# Patient Record
Sex: MALE | Race: WHITE | NOT HISPANIC OR LATINO | Employment: FULL TIME | URBAN - METROPOLITAN AREA
[De-identification: names, ages, dates, MRNs, and addresses within clinical notes are randomized per-mention and may not be internally consistent; named-entity substitution may affect disease eponyms.]

---

## 2018-04-17 DIAGNOSIS — M51.16 LUMBAR DISC DISEASE WITH RADICULOPATHY: Primary | ICD-10-CM

## 2018-04-24 ENCOUNTER — HOSPITAL ENCOUNTER (OUTPATIENT)
Dept: RADIOLOGY | Facility: HOSPITAL | Age: 33
Discharge: HOME | End: 2018-04-24
Attending: INTERNAL MEDICINE
Payer: COMMERCIAL

## 2018-04-24 DIAGNOSIS — M51.16 LUMBAR DISC DISEASE WITH RADICULOPATHY: ICD-10-CM

## 2018-04-24 PROCEDURE — 72148 MRI LUMBAR SPINE W/O DYE: CPT | Mod: TC

## 2018-04-24 PROCEDURE — 72148 MRI LUMBAR SPINE W/O DYE: CPT | Mod: 26,,, | Performed by: RADIOLOGY

## 2018-06-11 ENCOUNTER — OFFICE VISIT (OUTPATIENT)
Dept: NEUROSURGERY | Facility: CLINIC | Age: 33
End: 2018-06-11
Payer: COMMERCIAL

## 2018-06-11 VITALS
WEIGHT: 282.19 LBS | SYSTOLIC BLOOD PRESSURE: 143 MMHG | HEART RATE: 72 BPM | BODY MASS INDEX: 35.27 KG/M2 | DIASTOLIC BLOOD PRESSURE: 91 MMHG

## 2018-06-11 DIAGNOSIS — M51.36 DDD (DEGENERATIVE DISC DISEASE), LUMBAR: ICD-10-CM

## 2018-06-11 DIAGNOSIS — M79.18 MYOFASCIAL PAIN SYNDROME, CERVICAL: Primary | ICD-10-CM

## 2018-06-11 DIAGNOSIS — M54.59 MECHANICAL LOW BACK PAIN: ICD-10-CM

## 2018-06-11 PROCEDURE — 99204 OFFICE O/P NEW MOD 45 MIN: CPT | Mod: S$GLB,,, | Performed by: NEUROLOGICAL SURGERY

## 2018-06-11 PROCEDURE — 99999 PR PBB SHADOW E&M-EST. PATIENT-LVL III: CPT | Mod: PBBFAC,,, | Performed by: NEUROLOGICAL SURGERY

## 2018-06-11 PROCEDURE — 3008F BODY MASS INDEX DOCD: CPT | Mod: CPTII,S$GLB,, | Performed by: NEUROLOGICAL SURGERY

## 2018-06-11 NOTE — PROGRESS NOTES
NEUROSURGICAL OUTPATIENT CONSULTATION NOTE    DATE OF SERVICE:  06/11/2018    ATTENDING PHYSICIAN:  Jagdish Luis MD    CONSULT REQUESTED BY:  Self-referred    REASON FOR CONSULT:  Low back pain    SUBJECTIVE:    HISTORY OF PRESENT ILLNESS:  This is a very pleasant 32 y.o. male who works as a . Reports low back pain when lifting. Pain is not constant. Pain can irradiates in the right posterior leg. No weakness or numbness in the LE. Also reports tenderness at the cervico-thoracic junction between the scapula. Left neck pain with 4th and 5th fingers numbness on the left side that is intermittent. No elbow tenderness. Tried chiropractor and massage treatment without significant pain relief.         Low Back Pain Scale  R Low Back-Pain Score: 3  R Low Back-Pain Intensity: The pain is bad, but I manage without taking pain killers  R Low Back-Pain Score: I can look after myself normally without causing extra pain  Low Back-Lifting: I can lift heavy weights but it gives extra pain   Low Back-Walking: Pain does not prevent me from walking any distance   Low Back-Sitting: I can sit in any chair as long as I like   Low Back-Standing: I have some pain on standing but it does not increase with time   Low Back-Sleeping: I have pain in bed but it does not prevent me from sleeping well   Low Back-Social Life: My social life is normal and give me no pain   Low Back-Traveling: I have some pain when traveling but aminah of my usual forms of travel make it any worse   Low Back-Changing Degree of Pain:  (my pain is neither getting better nor worse)         PAST MEDICAL HISTORY:  Active Ambulatory Problems     Diagnosis Date Noted    No Active Ambulatory Problems     Resolved Ambulatory Problems     Diagnosis Date Noted    No Resolved Ambulatory Problems     No Additional Past Medical History       PAST SURGICAL HISTORY:  Past Surgical History:   Procedure Laterality Date    NOSE SURGERY         SOCIAL HISTORY:   Social  History     Social History    Marital status: Single     Spouse name: N/A    Number of children: N/A    Years of education: N/A     Occupational History    Not on file.     Social History Main Topics    Smoking status: Never Smoker    Smokeless tobacco: Not on file    Alcohol use Yes    Drug use: Unknown    Sexual activity: Not on file     Other Topics Concern    Not on file     Social History Narrative    No narrative on file       FAMILY HISTORY:  No family history on file.    CURRENTS MEDICATIONS:  No current outpatient prescriptions on file prior to visit.     No current facility-administered medications on file prior to visit.        ALLERGIES:  Review of patient's allergies indicates:   Allergen Reactions    Biaxin [clarithromycin]     Keflex [cephalexin]     Levaquin [levofloxacin] Swelling    Wasp sting [allergen ext-venom-honey bee] Swelling       REVIEW OF SYSTEMS:  Review of Systems   Constitutional: Negative for diaphoresis, fever and weight loss.   Respiratory: Negative for shortness of breath.    Cardiovascular: Negative for chest pain.   Gastrointestinal: Negative for blood in stool.   Genitourinary: Negative for hematuria.   Endo/Heme/Allergies: Does not bruise/bleed easily.   All other systems reviewed and are negative.      OBJECTIVE:    PHYSICAL EXAMINATION:   Vitals:    06/11/18 1338   BP: (!) 143/91   Pulse: 72       Physical Exam:  Vitals reviewed.    Constitutional: He appears well-developed and well-nourished.     Eyes: Pupils are equal, round, and reactive to light. Conjunctivae and EOM are normal.     Cardiovascular: Normal distal pulses and no edema.     Abdominal: Soft.     Skin: Skin displays no rash on trunk and no rash on extremities. Skin displays no lesions on trunk and no lesions on extremities.     Psych/Behavior: He is alert. He is oriented to person, place, and time. He has a normal mood and affect.     Musculoskeletal:        Neck: Range of motion is limited. ROM  severity is to the left.     Neurological:        DTRs: Tricep reflexes are 2+ on the right side and 2+ on the left side. Bicep reflexes are 2+ on the right side and 2+ on the left side. Brachioradialis reflexes are 2+ on the right side and 2+ on the left side. Patellar reflexes are 2+ on the right side and 2+ on the left side. Achilles reflexes are 2+ on the right side and 2+ on the left side.       Back Exam     Tenderness   The patient is experiencing tenderness in the cervical.    Range of Motion   Extension: normal   Flexion:  70 abnormal   Lateral Bend Right: normal   Lateral Bend Left: normal   Rotation Right: normal   Rotation Left: normal     Muscle Strength   Right Quadriceps:  5/5   Left Quadriceps:  5/5   Right Hamstrings:  5/5   Left Hamstrings:  5/5     Tests   Straight leg raise right: negative  Straight leg raise left: negative    Other   Toe Walk: normal  Heel Walk: normal                Neurologic Exam     Mental Status   Oriented to person, place, and time.   Speech: speech is normal   Level of consciousness: alert    Cranial Nerves   Cranial nerves II through XII intact.     CN III, IV, VI   Pupils are equal, round, and reactive to light.  Extraocular motions are normal.     Motor Exam   Muscle bulk: normal  Overall muscle tone: normal    Strength   Right deltoid: 5/5  Left deltoid: 5/5  Right biceps: 5/5  Left biceps: 5/5  Right triceps: 5/5  Left triceps: 5/5  Right wrist flexion: 5/5  Left wrist flexion: 5/5  Right wrist extension: 5/5  Left wrist extension: 5/5  Right interossei: 5/5  Left interossei: 5/5  Right iliopsoas: 5/5  Left iliopsoas: 5/5  Right quadriceps: 5/5  Left quadriceps: 5/5  Right hamstrin/5  Left hamstrin/5  Right anterior tibial: 5/5  Left anterior tibial: 5/5  Right posterior tibial: 5/5  Left posterior tibial: 5/5  Right peroneal: 5/5  Left peroneal: 5/5  Right gastroc: 5/5  Left gastroc: 5/5    Sensory Exam   Light touch normal.   Pinprick normal.     Gait,  Coordination, and Reflexes     Gait  Gait: normal    Coordination   Finger to nose coordination: normal  Tandem walking coordination: normal    Reflexes   Right brachioradialis: 2+  Left brachioradialis: 2+  Right biceps: 2+  Left biceps: 2+  Right triceps: 2+  Left triceps: 2+  Right patellar: 2+  Left patellar: 2+  Right achilles: 2+  Left achilles: 2+  Right plantar: normal  Left plantar: normal  Right Zavala: absent  Left Zavala: absent  Right ankle clonus: absent  Left ankle clonus: absent        DIAGNOSTIC DATA:  I personally reviewed the following imaging:   Lumbar spine MRI 04/24/2018: L4-5 ddd with central annular tear and subligamentous central herniation without significant stenosis, left L3-4 small disc herniation with lateral recess stenosis.     ASSESMENT:  This is a 32 y.o. male with     Problem List Items Addressed This Visit     None      Visit Diagnoses     Myofascial pain syndrome, cervical    -  Primary    Relevant Orders    Ambulatory consult to Physical Therapy    DDD (degenerative disc disease), lumbar        Relevant Orders    Ambulatory consult to Physical Therapy    Mechanical low back pain        Relevant Orders    Ambulatory consult to Physical Therapy          PLAN:  Cervical and lumbar PT 3 times a week for 6 weeks  Weight loss  Low carb diet  Follow-up in 6 weeks          Jagdish Luis MD  Pager: [phone]

## 2018-06-22 ENCOUNTER — CLINICAL SUPPORT (OUTPATIENT)
Dept: REHABILITATION | Facility: HOSPITAL | Age: 33
End: 2018-06-22
Attending: NEUROLOGICAL SURGERY
Payer: COMMERCIAL

## 2018-06-22 DIAGNOSIS — M54.40 BACK PAIN OF LUMBAR REGION WITH SCIATICA: ICD-10-CM

## 2018-06-22 PROCEDURE — 97161 PT EVAL LOW COMPLEX 20 MIN: CPT | Mod: PO

## 2018-06-22 PROCEDURE — 97110 THERAPEUTIC EXERCISES: CPT | Mod: PO

## 2018-06-22 PROCEDURE — 97140 MANUAL THERAPY 1/> REGIONS: CPT | Mod: PO

## 2018-06-22 NOTE — PROGRESS NOTES
"  PHYSICAL THERAPY INITIAL EVALUATION     Date: 06/22/2018  Name: Fransico Masters  Clinic Number: [id number]    Visit #: 1   Start Time:  1000  Stop Time:  1100  Time in treatment: 50 minutes    Orders:   MD PATTIE FUN[facility] NEUROSURGERY    Priority Start Date Expiration Date Referral Entered By   Routine 06/14/2018 12/31/2018 Jagdish Luis MD   Visits Requested Visits Authorized Visits Completed Visits Scheduled   1 20 1 8   Procedure Information     Procedure Modifiers Provider Requested Approved   [id number] - Ambulatory consult to Physical Therapy   1 1   Diagnosis Information     Diagnosis   M79.1 (ICD-10-CM) - Myofascial pain syndrome, cervical   M51.36 (ICD-10-CM) - DDD (degenerative disc disease), lumbar   M54.5 (ICD-10-CM) - Mechanical low back pain         History   History of Present Illness: " 32 y.o. male who works as a . Reports low back pain when lifting. Pain is not constant. Pain can irradiates in the right posterior leg. No weakness or numbness in the LE. Also reports tenderness at the cervico-thoracic junction between the scapula. Left neck pain with 4th and 5th fingers numbness on the left side that is intermittent. No elbow tenderness. Tried chiropractor and massage treatment without significant pain relief.   "       Treatment Diagnosis:   1. Back pain of lumbar region with sciatica         Past Medical History    No past medical history on file.    Allergies  Review of patient's allergies indicates:   Allergen Reactions    Biaxin [clarithromycin]     Keflex [cephalexin]     Levaquin [levofloxacin] Swelling    Wasp sting [allergen ext-venom-honey bee] Swelling       Current Medication list:   No current outpatient prescriptions on file prior to visit.     No current facility-administered medications on file prior to visit.        Precautions: Standard  Prior Therapy: no  Prior Cervical or Back surgery no  Prior TAYLOR  no  Diagnostic Tests:     FINDINGS:  There is no evidence of " fracture or marrow placement process.  There is mild disc space narrowing and disc desiccation at L4-5.  Conus terminates at approximately L1.  Visualized cord signal is within normal range.  The visualized retroperitoneal structures demonstrate no significant abnormality.    T12-L1: There is no significant disc protrusion, central canal stenosis or neural foraminal narrowing.    L1-L2, L2-L3: Mild diffuse disc bulge without evidence of significant central canal stenosis or neural foraminal narrowing.    L3-L4: There is a diffuse disc bulge with a superimposed left paracentral/foraminal protrusion with an annular fissure.  This causes mild neural foraminal narrowing but may abut the exiting L3 and possibly the deep sending L4 nerve roots.  Overall, no significant central canal stenosis or right neural foraminal narrowing.    L4-5: Diffuse disc bulge with a annular fissure centrally with a small superimposed central protrusion.  No significant central canal stenosis or neural foraminal narrowing.    L5-S1: Mild diffuse disc bulge without evidence of significant central canal stenosis or neural foraminal narrowing.    There is a pear-shaped configuration of the thecal sac at the level of S1-S2 possibly representing a partially conjoined root as an incidental finding.   Impression       Degenerative disc disease at L3-4 and L4-5 as detailed above.  At L3-4, there is a left paracentral/foraminal disc protrusion which may impinge on the descending left L4 and possibly the L eft exiting L3 nerve root.  Correlate for symptoms referable to this level.         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 Numbness n occasion to lefth 4th and 5th digits  Chief complaint: Nagging pain that is intermittent   Activities that increase symptoms:   Crawling under houses, lifting heavy items in awkward positions at work  Activities that  decrease symptoms:   rest  Are symptoms changing since onset:  No just persistent   Pain level with 0 being the lowest and 10 being at onset of treatment:  3-4  Pain level with 0 being the lowest and 10 being at conclusion of treatment:   3-4  Pain level at best: 0  Pain level at worst: 5  Has there been a change in functional status since onset of symptoms:   no  Current functional status: independent  Exercise routine prior to onset :  None    Coughing, sneezing, and straining do not affect symptoms   Bowel and Bladder function is normal  Numbness or tingling: no  Weakness in UEs?  Pts goals:decrease pain,     Objective   32 y.o. White male arrives to clinic in NAD  GAIT: no deviation    POSTURAL examination in standing: forward head, rounded shoulders,protrusive abdomen, level pelvis     Has a slouched posture   Does body habitus affect function and or movement ability?  yes     Cervical ROM: WNL with pain associated with flexion, bilateral lateral flexion, bilateral rotation      Sharp Piyush  test of Tranverse Ligament:    Alar Ligament Stress  Test of stability of C2 motion: normal    AROM shoulders:      WNL  AROM elbow:            WNL         AROM hand               WNL      UE MMT                                 RIGHT                                    LEFT                                                 Grossly 5/5                              Grossly 5/5                  Palpation: Tender to palpation at T4,5,6,7,8,9 spinous processes and transverse processes                     Muscle spasms not noted at cervical or thoracic regions                      Overall muscle tone increased     Sensation:  Intact to light touch UE, LEs              LUMBAR  ROM: 100% of normal range                    Flexion:                       100% with  pain       Extension:                       100% with  pain       Right Sidebendin% with  pain       Left Sidebendin% with out pain       Right Rotation:          100% with out pain       Left Rotation:          100% with out  pain     AROM Hips: WFL   AROM Knees WFL   AROM Ankles WFL      LE MMT                                  Right   Grossly 5/5                 Left  Grossly 5/5 Left        FLEXIBILITY:  Right:  Hamstrings tight to 0 degrees in supine with hip flexed to 90 degrees  Left:  Hamstrings tight to 0 degrees in supine with hip flexed to 90 degrees  Gastroc/soleus: neutral   Piriformis:tight bilaterally     SPECIAL TESTS  SLR: negative  Bilateral bridge  negative  Unilateral bridge  nt    PALPATION: Tender to palpation at midline LS junction                        Muscle spasms notnoted  In LS region  SENSATION: intact to light touch BUE        Treatment          TREATMENT: EVALUATION COMPLETE   THEREX: 15 minutes of one to one instruction , manual and/or verbal cues for exercises designed to stretch, strengthen and build endurance of the affected body parts  5  reps of each with a 5 second hold time  In supine:  Pelvic tilt  Pelvic tilt > bridge   Pelvic tilt set > single knee to chest  Pelvic tilt > double knee to chest  Pelvic tilt > lateral lumbar rotation  Sciatic nerve floss  Seated thoracic extension  Lower cervical extension  Scapular retraction  Shoulder rolls posteriorly  Cervical Retraction  Achilles stretch on step 5 x 10 sec  Gastroc stretch on step 5 x 10 sec    Manual therapy x 10 min to thoracic spine with PA glide grade 3 at t 9-10, DF<, MFR to thoracic and lumbar paravertebral mm    Education   Patient was provided with a written copy of the above home exercises indicated  in bold to perform as tolerated within limits of pain.  Exercises were reviewed and patient was able to demonstrate them prior to the end of the session.Pt instructed in proper use of ice or heat to limb.    All of the patients questions were answered  Goals of therapy, the roles of PT and PTA, and the need for compliance of appointments, attendance policy and HEP was discussed with patient .  Patient expressed understanding and agreement with above education.      No barriers to learning or social/cultural issues were identified that could hinder therapy.    Assessment   Pt with both thoracic and lumbar pain  felt  Slight decreasein symptoms post therapy and suffered no adverse effects with treatment.   .  His ROM of cervical, thoracic and lumbar spine and  UE and LE MMT is normal.  He does have piriformis tightness bilaterally which could be cause of LE radiculopathy.He exhibits weak core mm which will beneftit from skilled PT services to address these deficits  Pt's spiritual, cultural and educational needs considered and pt agreeable to plan of care and goals as stated below:    Medical necessity is demonstrated by the following impairments/problem list:   Unable to participate fully in daily activities    Inability to participate fully in vocational pursuits    Requires skilled supervision to complete and progress HEP    Generalized weakness    Impaired muscle strength   I Pain    Anticipated barriers to physical therapy: none    G Code   na           Goals   Goals to be met in 8 weeks:   1) Pt will be independent and report consistency in  performing HEP to maximize benefit from PT  2) Pt will report use of home modalities for pain management.   4) Pt will report one degree less of pain  5/7 days  5) Pt will report interrupted sleep no more than twice a night.  6) Pt will report improved ability to lift moderately heavy ~25# items without pain in back  Re-assessment due on or before:  7/18/18    PTA may be  involved in patient care as part of the Rehab team.      Plan   Pt will be seen 1-2 times per week for 6 weeks.   Recommended Treatment Plan will include:  Manual soft tissue and/or joint mobilization  Therapeutic Exercise  Neuromuscular re-education          Individualized Home Exercise Program  Modalities: heat/ice, estim, US, dry needling as needed         Pt education    PTA may be involved in patient's care as part of the Rehab Team.        History  Co-morbidities and personal factors that may impact the plan of care Examination  Body Structures and Functions, activity limitations and participation restrictions that may impact the plan of care Clinical Presentation   Co-morbidities:   obesity    Personal Factors:   occupation Body Regions:   back  lower extremities  trunk    Body Systems:    ROM  strength    Participation Restrictions:   no     Activity limitations:   Learning and applying knowledge  no deficits  General Tasks and Commands  no deficits  Communication  no deficits  Mobility  lifting and carrying objects  walking  Self care  no deficits  Domestic Life  no deficits  Interactions/Relationships  no deficits  Life Areas  employment  Community and Social Life  recreation and leisure         stable and uncomplicated                  low   moderate moderate Decision Making/ Complexity Score:  moderate        Francine Jenkins, PT, MHA, WC  06/22/2018

## 2018-06-28 ENCOUNTER — CLINICAL SUPPORT (OUTPATIENT)
Dept: REHABILITATION | Facility: HOSPITAL | Age: 33
End: 2018-06-28
Attending: NEUROLOGICAL SURGERY
Payer: COMMERCIAL

## 2018-06-28 DIAGNOSIS — M54.40 BACK PAIN OF LUMBAR REGION WITH SCIATICA: ICD-10-CM

## 2018-06-28 PROCEDURE — 97014 ELECTRIC STIMULATION THERAPY: CPT | Mod: PO

## 2018-06-28 PROCEDURE — 97110 THERAPEUTIC EXERCISES: CPT | Mod: PO

## 2018-06-28 NOTE — PROGRESS NOTES
"  PHYSICAL THERAPY      Date: 06/28/2018  Name: Fransico Elizondoelliott  Clinic Number: [id number]    Visit #: 2   Start Time:  305  Stop Time:  400  Time in treatment: 55 minutes    Orders:   MD PATTIE FUNK [facility] NEUROSURGERY    Priority Start Date Expiration Date Referral Entered By   Routine 06/14/2018 12/31/2018 Jagdish Luis MD   Visits Requested Visits Authorized Visits Completed Visits Scheduled   1 20 2 8   Procedure Information     Procedure Modifiers Provider Requested Approved   [id number] - Ambulatory consult to Physical Therapy   1 1   Diagnosis Information     Diagnosis   M79.1 (ICD-10-CM) - Myofascial pain syndrome, cervical   M51.36 (ICD-10-CM) - DDD (degenerative disc disease), lumbar   M54.5 (ICD-10-CM) - Mechanical low back pain         History   History of Present Illness: " 32 y.o. male who works as a . Reports low back pain when lifting. Pain is not constant. Pain can irradiates in the right posterior leg. No weakness or numbness in the LE. Also reports tenderness at the cervico-thoracic junction between the scapula. Left neck pain with 4th and 5th fingers numbness on the left side that is intermittent. No elbow tenderness. Tried chiropractor and massage treatment without significant pain relief.   "       Treatment Diagnosis:   1. Back pain of lumbar region with sciatica       Diagnostic Tests:     Degenerative disc disease at L3-4 and L4-5 as detailed above.  At L3-4, there is a left paracentral/foraminal disc protrusion which may impinge on the descending left L4 and possibly the L eft exiting L3 nerve root.  Correlate for symptoms referable to this level.       Subjective   Fransico RAMOS Sonam states that after our session on Friday, he awakened Saturday morning and had severe pain in his entire lumbar region that was 8/10 in intensity.  "I could hardly move until Tuesday"    Today he is not having cervical or thoracic pain but the central lumbar region is painful 4/10      Right LE sciatica " from posterior thigh to posterior ankle. Numbness n occasion to left  4th and 5th digits  Chief complaint: Nagging pain that is intermittent   Pain level with 0 being the lowest and 10 being at onset of treatment:3-4  Pain at conclusion of treatment:  0      Objective   32 y.o. White male arrives to clinic in NAD     Palpation:                          Tender to palpation at midline LS junction                             Muscle spasms not noted  In LS region        Treatment          TREATMENT: THEREX: 15 minutes of one to one instruction , manual and/or verbal cues for exercises designed to stretch, strengthen and build endurance of the affected body parts  5  reps of each with a 5 second hold time  In supine:  Pelvic tilt  Pelvic tilt > bridge   Pelvic tilt set > single knee to chest  Pelvic tilt > double knee to chest  Pelvic tilt > lateral lumbar rotation  Sciatic nerve floss  Pelvic tilt> heel tap     Not performed in clinic  Seated thoracic extension  Lower cervical extension  Scapular retraction  Shoulder rolls posteriorly  Cervical Retraction  Achilles stretch on step 5 x 10 sec  Gastroc stretch on step 5 x 10 sec    Patient was instructed in use of TENS, precautions with use of TENS, contraindications to use of TENS and expressed understanding and agreed to treatment.  Patient received TENS for pain control applied to lumbar paravertebral mm, pulse rate = 80 Hz, pulse width = 220 uS, normal  current for 45minutes while on moist heat in supine 90/90 position  .  Education   Exercises were reviewed and patient was able to demonstrate them prior to the end of the session.Pt instructed in proper use of ice or heat to limb.    All of the patients questions were answered  Goals of therapy, the roles of PT and PTA, and the need for compliance of appointments, attendance policy and HEP was discussed with patient .  Patient expressed understanding and agreement with above education.      No barriers to learning or  social/cultural issues were identified that could hinder therapy.    Assessment   Pt with increased pain after last session.  He tolerated treatment well today and at conclusion of session was pain free.  He ordered a TENS from [employer] while still in clinic and plans to use it for self management of pain.  We will advance therex next session to include more advanced core activity    Goals   Goals to be met in 8 weeks:   1) Pt will be independent and report consistency in  performing HEP to maximize benefit from PT  2) Pt will report use of home modalities for pain management.   4) Pt will report one degree less of pain  5/7 days  5) Pt will report interrupted sleep no more than twice a night.  6) Pt will report improved ability to lift moderately heavy ~25# items without pain in back  Re-assessment due on or before:  7/18/18    PTA may be involved in patient care as part of the Rehab team.      Plan   Advance core activity    Francine Jenkins, PT, MHA, WCC  06/28/2018

## 2018-07-06 ENCOUNTER — CLINICAL SUPPORT (OUTPATIENT)
Dept: REHABILITATION | Facility: HOSPITAL | Age: 33
End: 2018-07-06
Attending: NEUROLOGICAL SURGERY
Payer: COMMERCIAL

## 2018-07-06 DIAGNOSIS — M54.40 BACK PAIN OF LUMBAR REGION WITH SCIATICA: Primary | ICD-10-CM

## 2018-07-06 PROCEDURE — 97110 THERAPEUTIC EXERCISES: CPT | Mod: PO

## 2018-07-06 NOTE — PROGRESS NOTES
"  PHYSICAL THERAPY      Date: 07/06/2018  Name: Fransico Masters  Clinic Number: [id number]    Visit #: 3  Start Time:  810  Stop Time:  915   Time in treatment: 65 minutes    Orders:   MD PATTIE FUNK [facility] NEUROSURGERY    Priority Start Date Expiration Date Referral Entered By   Routine 06/14/2018 12/31/2018 Jagdish Luis MD   Visits Requested Visits Authorized Visits Completed Visits Scheduled   1 20 3 8   Procedure Information     Procedure Modifiers Provider Requested Approved   [id number] - Ambulatory consult to Physical Therapy   1 1   Diagnosis Information     Diagnosis   M79.1 (ICD-10-CM) - Myofascial pain syndrome, cervical   M51.36 (ICD-10-CM) - DDD (degenerative disc disease), lumbar   M54.5 (ICD-10-CM) - Mechanical low back pain         History   History of Present Illness: " 32 y.o. male who works as a . Reports low back pain when lifting. Pain is not constant. Pain can irradiates in the right posterior leg. No weakness or numbness in the LE. Also reports tenderness at the cervico-thoracic junction between the scapula. Left neck pain with 4th and 5th fingers numbness on the left side that is intermittent. No elbow tenderness. Tried chiropractor and massage treatment without significant pain relief.   "       Treatment Diagnosis:   1. Back pain of lumbar region with sciatica       Diagnostic Tests:     Degenerative disc disease at L3-4 and L4-5 as detailed above.  At L3-4, there is a left paracentral/foraminal disc protrusion which may impinge on the descending left L4 and possibly the L eft exiting L3 nerve root.  Correlate for symptoms referable to this level.       Subjective   Fransico Masters states that he felt fine after his last session and today he is not having cervical or thoracic pain but is experiencing minimal pain in the central lumbar region.    Chief complaint: Nagging pain that is intermittent, very sporadic comes with pain every few days or every few months  Pain level with 0 " being the lowest and 10 being at onset of treatment: min pain, number was not given  Pain at conclusion of treatment:  0      Objective   32 y.o. White male arrives to clinic in NAD     Palpation:                          Tender to palpation at midline LS junction                             Muscle spasms not noted  In LS region        Treatment          TREATMENT: THEREX: 15 minutes of one to one instruction , manual and/or verbal cues for exercises designed to stretch, strengthen and build endurance of the affected body parts    In supine:  Piriformis stretch  Pelvic tilt > TA activation  2 x 15 with 5 sec hold  Pelvic tilt > bridge 2 x 10 with 5 sec holds  Pelvic tilt  > TA activation > knee fall outs GTB 1 x 10 with 5 sec hold  Pelvic tilt> TA activation > heel tap  Pelvic tilt > double knee to chest  Pelvic tilt > lateral lumbar rotation  Pelvic tilt > TA activation > dead bugs  Sciatic nerve floss  Rows OTB     Not performed in clinic  Seated thoracic extension  Lower cervical extension  Scapular retraction  Shoulder rolls posteriorly  Cervical Retraction  Achilles stretch on step 5 x 10 sec  Gastroc stretch on step 5 x 10 sec    Patient received moist heat in supine 90/90 position for 10 minutes.  .  Education   Exercises were reviewed and patient was able to demonstrate them prior to the end of the session.Pt instructed in proper use of ice or heat to limb.    All of the patients questions were answered  Goals of therapy, the roles of PT and PTA, and the need for compliance of appointments, attendance policy and HEP was discussed with patient .  Patient expressed understanding and agreement with above education.      No barriers to learning or social/cultural issues were identified that could hinder therapy.    Assessment   Pt tolerated treatment well today and at conclusion of session was pain free. Core stabilization exercises were advanced during today's tx visit. It was noted that pt was instructed that  pt perform all exercises in a pain free range. Will progress as tolerated.     Goals   Goals to be met in 8 weeks:   1) Pt will be independent and report consistency in  performing HEP to maximize benefit from PT  2) Pt will report use of home modalities for pain management.   4) Pt will report one degree less of pain  5/7 days  5) Pt will report interrupted sleep no more than twice a night.  6) Pt will report improved ability to lift moderately heavy ~25# items without pain in back  Re-assessment due on or before:  7/18/18    PTA may be involved in patient care as part of the Rehab team.      Plan   Advance core activity    Leonid Ng, PTA  07/06/2018

## 2018-07-09 ENCOUNTER — CLINICAL SUPPORT (OUTPATIENT)
Dept: REHABILITATION | Facility: HOSPITAL | Age: 33
End: 2018-07-09
Attending: NEUROLOGICAL SURGERY
Payer: COMMERCIAL

## 2018-07-09 DIAGNOSIS — M54.40 BACK PAIN OF LUMBAR REGION WITH SCIATICA: ICD-10-CM

## 2018-07-09 PROCEDURE — 97110 THERAPEUTIC EXERCISES: CPT | Mod: PO

## 2018-07-09 NOTE — PROGRESS NOTES
"  PHYSICAL THERAPY      Date: 07/09/2018  Name: Fransico Masters  Clinic Number: [id number]    Visit #: 4  Start Time:  505  Stop Time:  600  Time in treatment: 55 minutes    Orders:   MD PATTIE FUNK [facility] NEUROSURGERY    Priority Start Date Expiration Date Referral Entered By   Routine 06/14/2018 12/31/2018 Jagdish Luis MD   Visits Requested Visits Authorized Visits Completed Visits Scheduled   1 20 4 8   Procedure Information       Diagnosis   M79.1 (ICD-10-CM) - Myofascial pain syndrome, cervical   M51.36 (ICD-10-CM) - DDD (degenerative disc disease), lumbar   M54.5 (ICD-10-CM) - Mechanical low back pain         History   History of Present Illness: " 32 y.o. male who works as a . Reports low back pain when lifting. Pain is not constant. Pain can irradiates in the right posterior leg. No weakness or numbness in the LE. Also reports tenderness at the cervico-thoracic junction between the scapula. Left neck pain with 4th and 5th fingers numbness on the left side that is intermittent. No elbow tenderness. Tried chiropractor and massage treatment without significant pain relief.   "       Treatment Diagnosis:   1. Back pain of lumbar region with sciatica       Diagnostic Tests:     Degenerative disc disease at L3-4 and L4-5 as detailed above.  At L3-4, there is a left paracentral/foraminal disc protrusion which may impinge on the descending left L4 and possibly the L eft exiting L3 nerve root.  Correlate for symptoms referable to this level.       Subjective     Pt reports some soreness the day of treatment.  Pt reports stiffness in the low back, no pain.      Objective     TREATMENT: THEREX: 55 minutes of one to one instruction , manual and/or verbal cues for exercises designed to stretch, strengthen and build endurance of the affected body parts    In supine:  Piriformis stretch 3 x 30"  Pelvic tilt > TA activation  2 x 15 with 5 sec hold  Pelvic tilt > bridge 2 x 10 with 5 sec holds  Pelvic tilt  > TA " activation > knee fall outs GTB 1 x 20 with 5 sec hold  Pelvic tilt > with march  Pelvic tilt > double knee to chest- NP increased pain  Pelvic tilt > lateral lumbar rotation  Pelvic tilt > TA activation > dead bugs  Quadruped LE extension x 10  Bird dog x 5  Quadruped thoracic rotation with hand behind head x 10        Not performed in clinic:  Sciatic nerve floss  Rows OTB   Seated thoracic extension  Lower cervical extension  Scapular retraction  Shoulder rolls posteriorly  Cervical Retraction  Achilles stretch on step 5 x 10 sec  Gastroc stretch on step 5 x 10 sec  Pelvic tilt> TA activation > heel tap- NP      Patient received moist heat in supine 90/90 position for 10 minutes.- NP  .  Education   Exercises were reviewed and patient was able to demonstrate them prior to the end of the session.  All of the patients questions were answered  Patient expressed understanding and agreement with above education.      No barriers to learning or social/cultural issues were identified that could hinder therapy.    Assessment     Pt tolerated treatment well today with no increase in pain.  Pt with no episodes of pain throughout exercises. Pt reported increased LBP with DKTC so exercise was halted.  Pt required some verbal and tactile cues to activate core with exercises. Will progress as tolerated.     Goals   Goals to be met in 8 weeks:   1) Pt will be independent and report consistency in  performing HEP to maximize benefit from PT  2) Pt will report use of home modalities for pain management.   4) Pt will report one degree less of pain  5/7 days  5) Pt will report interrupted sleep no more than twice a night.  6) Pt will report improved ability to lift moderately heavy ~25# items without pain in back  Re-assessment due on or before:  7/18/18    PTA may be involved in patient care as part of the Rehab team.      Plan   Advance core activity    Leonid Ng, SHANELL  07/09/2018

## 2018-07-12 ENCOUNTER — CLINICAL SUPPORT (OUTPATIENT)
Dept: REHABILITATION | Facility: HOSPITAL | Age: 33
End: 2018-07-12
Attending: NEUROLOGICAL SURGERY
Payer: COMMERCIAL

## 2018-07-12 DIAGNOSIS — M54.40 BACK PAIN OF LUMBAR REGION WITH SCIATICA: ICD-10-CM

## 2018-07-12 PROCEDURE — 97110 THERAPEUTIC EXERCISES: CPT | Mod: PO

## 2018-07-12 NOTE — PROGRESS NOTES
"  PHYSICAL THERAPY      Date: 07/12/2018  Name: Fransico Masters  Clinic Number: [id number]    Visit #: 5  Start Time:  505  Stop Time:  600  Time in treatment: 55 minutes    Orders:   MD PATTIE FUNK [facility] NEUROSURGERY    Priority Start Date Expiration Date Referral Entered By   Routine 06/14/2018 12/31/2018 Jagdish Luis MD   Visits Requested Visits Authorized Visits Completed Visits Scheduled   1 20 5 8   Procedure Information       Diagnosis   M79.1 (ICD-10-CM) - Myofascial pain syndrome, cervical   M51.36 (ICD-10-CM) - DDD (degenerative disc disease), lumbar   M54.5 (ICD-10-CM) - Mechanical low back pain         History   History of Present Illness: " 32 y.o. male who works as a . Reports low back pain when lifting. Pain is not constant. Pain can irradiates in the right posterior leg. No weakness or numbness in the LE. Also reports tenderness at the cervico-thoracic junction between the scapula. Left neck pain with 4th and 5th fingers numbness on the left side that is intermittent. No elbow tenderness. Tried chiropractor and massage treatment without significant pain relief.   "       Treatment Diagnosis:   1. Back pain of lumbar region with sciatica       Diagnostic Tests:     Degenerative disc disease at L3-4 and L4-5 as detailed above.  At L3-4, there is a left paracentral/foraminal disc protrusion which may impinge on the descending left L4 and possibly the L eft exiting L3 nerve root.  Correlate for symptoms referable to this level.       Subjective     Pt reports no soreness following treatment.  Pt with no episodes of sharp pain following treatment.    Objective     TREATMENT: THEREX: 55 minutes of one to one instruction , manual and/or verbal cues for exercises designed to stretch, strengthen and build endurance of the affected body parts    In supine:  Piriformis stretch 3 x 30"  Pelvic tilt > TA activation  2 x 15 with 5 sec hold  Pelvic tilt > bridge 3 x 10 with 5 sec holds  Pelvic tilt  > " TA activation > knee fall outs GTB x 30 with 5 sec hold  Pelvic tilt > single knee to chest  Pelvic tilt > alternating marches  Pelvic tilt > double knee to chest- NP increased pain  Pelvic tilt > lateral lumbar rotation  Pelvic tilt > dead bugs  DKTC with orange theraball and straight arm pulldown with orange fong band x 20  Open book x 10  Cat/camel x 10- some sternum pain  HS stretch with strap 3 x 30 sec  Bird dog x 10  Quadruped thoracic rotation with hand behind head x 10        Not performed in clinic:  Sciatic nerve floss  Rows OTB   Seated thoracic extension  Lower cervical extension  Scapular retraction  Shoulder rolls posteriorly  Cervical Retraction  Achilles stretch on step 5 x 10 sec  Gastroc stretch on step 5 x 10 sec  Pelvic tilt> TA activation > heel tap- NP      Patient received moist heat in supine 90/90 position for 10 minutes.- NP  .  Education     HEP added: open book and cat/camel  Exercises were reviewed and patient was able to demonstrate them prior to the end of the session.  All of the patients questions were answered  Patient expressed understanding and agreement with above education.      No barriers to learning or social/cultural issues were identified that could hinder therapy.    Assessment     Pt tolerated treatment well today with no increase in pain.  Pt was able to perform TA activations much better than the previous treatment. Will progress as tolerated.     Goals   Goals to be met in 8 weeks:   1) Pt will be independent and report consistency in  performing HEP to maximize benefit from PT  2) Pt will report use of home modalities for pain management.   4) Pt will report one degree less of pain  5/7 days  5) Pt will report interrupted sleep no more than twice a night.  6) Pt will report improved ability to lift moderately heavy ~25# items without pain in back  Re-assessment due on or before:  7/18/18    PTA may be involved in patient care as part of the Rehab team.      Plan    Advance core activity

## 2018-07-26 ENCOUNTER — CLINICAL SUPPORT (OUTPATIENT)
Dept: REHABILITATION | Facility: HOSPITAL | Age: 33
End: 2018-07-26
Attending: NEUROLOGICAL SURGERY
Payer: COMMERCIAL

## 2018-07-26 DIAGNOSIS — M54.40 BACK PAIN OF LUMBAR REGION WITH SCIATICA: ICD-10-CM

## 2018-07-26 PROCEDURE — 97110 THERAPEUTIC EXERCISES: CPT | Mod: PO

## 2018-07-26 NOTE — PROGRESS NOTES
"  PHYSICAL THERAPY  DISCHARGE     Date: 07/26/2018  Name: Fransico Masters  Clinic Number: [id number]    Visit #: 7  Start Time:  503  Stop Time 525  Time in treatment: 21minutes    Orders:   MD PATTIE FUNK [facility] NEUROSURGERY    Priority Start Date Expiration Date Referral Entered By   Routine 06/14/2018 12/31/2018 Jagdish Luis MD   Visits Requested Visits Authorized Visits Completed Visits Scheduled   1 20 7 8   Procedure Information     Procedure Modifiers Provider Requested Approved   [id number] - Ambulatory consult to Physical Therapy   1 1   Diagnosis Information     Diagnosis   M79.1 (ICD-10-CM) - Myofascial pain syndrome, cervical   M51.36 (ICD-10-CM) - DDD (degenerative disc disease), lumbar   M54.5 (ICD-10-CM) - Mechanical low back pain         History   History of Present Illness: " 32 y.o. male who works as a . Reports low back pain when lifting. Pain is not constant. Pain can radiate in the right posterior leg. No weakness or numbness in the LE. Also reports tenderness at the cervico-thoracic junction between the scapula. Left neck pain with 4th and 5th fingers numbness on the left side that is intermittent. No elbow tenderness. Tried chiropractor and massage treatment without significant pain relief.   "       Treatment Diagnosis:   1. Back pain of lumbar region with sciatica     Patient Therapy Goals: decreased pain    Subjective   Fransico Masters states he is no longer having pain in his back, neck, thorax, and leg.  He feels HEP has 'done the trick'  He also states he is no longer experiencing any radicular symptoms.  He requests discharge today and states he would like to go home to do his exercises and beat traffic.      Objective   32 y.o. White male arrives to clinic in NAD  GAIT: no deviation    POSTURAL examination in standing: forward head, rounded shoulders,protrusive abdomen, level pelvis     Has a much more upright posture today,.  Does body habitus affect function and or movement " "ability?  No--he has lost 35# in last month intentionally     Cervical ROM: WNL but is no longer experiencing any pain         UE MMT                                 RIGHT                                    LEFT                                                 Grossly 5/5                              Grossly 5/5                  Palpation: NON_Tender to palpation at T4,5,6,7,8,9 spinous processes and transverse processes                     Muscle spasms not noted at cervical or thoracic regions                     Overall muscle tone normal       LUMBAR  ROM: 100% of normal range  Without any pain                LE MMT                                  Right   Grossly 5/5                 Left  Grossly 5/5 Left        FLEXIBILITY:  Right:  Hamstrings tight to 0 degrees in supine with hip flexed to 90 degrees  Left:  Hamstrings tight to 0 degrees in supine with hip flexed to 90 degrees  Gastroc/soleus: neutral   Piriformis: still tight bilaterally     SPECIAL TESTS  SLR: negative  Bilateral bridge  negative  Unilateral bridge  nt    PALPATION: No longer Tender to palpation at midline LS junction                        Muscle spasms notnoted  In LS region    Treatment       THEREX: 13 minutes of one to one instruction , manual and/or verbal cues for exercises designed to stretch, strengthen and build endurance of the affected body parts    In supine:  Piriformis stretch 3 x 30"  Pelvic tilt > TA activation  2 x 15 with 5 sec hold    DKTC with orange theraball and straight arm pulldown with orange fong band x 20  Open book x 10  Cat/camel x 10- Cervical Retraction        EDUCATION: Pt is now on a comprehensive  HEP which Pt is faithful to do at least 3-5x weekly.  Patient is ready for discharge and was offered info regarding [employer] but he is not interested at this time.  .    Assessment   Pt with both thoracic and lumbar pain upon initial evaluation now reports no longer having symptoms and is requesting " discharge from PT.  He demonstrated compliance with HEP and should be able to manage pain at home if it returns.  He is discharged this date with excellent outcome:      Goals   Goals to be met in 8 weeks:   1) Pt will be independent and report consistency in  performing HEP to maximize benefit from PT  MET  2) Pt will report use of home modalities for pain management. MET    4) Pt will report one degree less of pain  5/7 daysMET    5) Pt will report interrupted sleep no more than twice a night.   6) Pt will report improved ability to lift moderately heavy ~25# itMET  ems without pain in back    DISCHARGED FROM PT     Francine Jenkins, PT, MHA, [facility]  07/26/2018

## 2018-08-13 ENCOUNTER — OFFICE VISIT (OUTPATIENT)
Dept: NEUROSURGERY | Facility: CLINIC | Age: 33
End: 2018-08-13
Payer: COMMERCIAL

## 2018-08-13 VITALS
DIASTOLIC BLOOD PRESSURE: 76 MMHG | WEIGHT: 255 LBS | BODY MASS INDEX: 31.87 KG/M2 | HEART RATE: 74 BPM | SYSTOLIC BLOOD PRESSURE: 121 MMHG

## 2018-08-13 DIAGNOSIS — M51.36 DDD (DEGENERATIVE DISC DISEASE), LUMBAR: Primary | ICD-10-CM

## 2018-08-13 PROCEDURE — 99999 PR PBB SHADOW E&M-EST. PATIENT-LVL II: CPT | Mod: PBBFAC,,, | Performed by: NEUROLOGICAL SURGERY

## 2018-08-13 PROCEDURE — 99212 OFFICE O/P EST SF 10 MIN: CPT | Mod: S$GLB,,, | Performed by: NEUROLOGICAL SURGERY

## 2018-08-13 PROCEDURE — 3008F BODY MASS INDEX DOCD: CPT | Mod: CPTII,S$GLB,, | Performed by: NEUROLOGICAL SURGERY

## 2018-08-13 NOTE — PROGRESS NOTES
NEUROSURGICAL PROGRESS NOTE    DATE OF SERVICE:  08/13/2018    ATTENDING PHYSICIAN:  Jagdish Luis MD    SUBJECTIVE:    INTERIM HISTORY:    This is a very pleasant 33 y.o. male, who was complaining of low back pain and leg pain. He has lumbar ddd. He was treated conservatively with keto diet, weight loss and PT. He is doing much better, has lost about 30 pounds. Feels more energized. His pain has completely subsided. Still does his PT exercises at home.     Low Back Pain Scale  R Low Back-Pain Score: 0  R Low Back-Pain Intensity: I can tolerate the pain I have without having to use pain killers  R Low Back-Pain Score: I can look after myself normally without causing extra pain  Low Back-Lifting: I can lift heavy weights without extra pain   Low Back-Walking: Pain does not prevent me from walking any distance   Low Back-Sitting: I can sit in any chair as long as I like   Low Back-Standing: I can stand as long as I want without pain   Low Back-Sleeping: I have no pain in bed   Low Back-Social Life: My social life is normal and give me no pain   Low Back-Traveling: I have no pain when traveling   Low Back-Changing Degree of Pain: My pain is rapidly getting better         PAST MEDICAL HISTORY:  Active Ambulatory Problems     Diagnosis Date Noted    Myofascial pain syndrome, cervical 06/11/2018    DDD (degenerative disc disease), lumbar 06/11/2018    Mechanical low back pain 06/11/2018    Back pain of lumbar region with sciatica 06/22/2018     Resolved Ambulatory Problems     Diagnosis Date Noted    No Resolved Ambulatory Problems     No Additional Past Medical History       PAST SURGICAL HISTORY:  Past Surgical History:   Procedure Laterality Date    NOSE SURGERY         SOCIAL HISTORY:   Social History     Socioeconomic History    Marital status: Single     Spouse name: Not on file    Number of children: Not on file    Years of education: Not on file    Highest education level: Not on file   Social Needs     Financial resource strain: Not on file    Food insecurity - worry: Not on file    Food insecurity - inability: Not on file    Transportation needs - medical: Not on file    Transportation needs - non-medical: Not on file   Occupational History    Not on file   Tobacco Use    Smoking status: Never Smoker   Substance and Sexual Activity    Alcohol use: Yes    Drug use: Not on file    Sexual activity: Not on file   Other Topics Concern    Not on file   Social History Narrative    Not on file       FAMILY HISTORY:  No family history on file.    CURRENTS MEDICATIONS:  No current outpatient medications on file prior to visit.     No current facility-administered medications on file prior to visit.        ALLERGIES:  Review of patient's allergies indicates:   Allergen Reactions    Biaxin [clarithromycin]     Keflex [cephalexin]     Levaquin [levofloxacin] Swelling    Wasp sting [allergen ext-venom-honey bee] Swelling       REVIEW OF SYSTEMS:  Review of Systems   Constitutional: Negative for diaphoresis, fever and weight loss.   Respiratory: Negative for shortness of breath.    Cardiovascular: Negative for chest pain.   Gastrointestinal: Negative for blood in stool.   Genitourinary: Negative for hematuria.   Endo/Heme/Allergies: Does not bruise/bleed easily.   All other systems reviewed and are negative.        OBJECTIVE:    PHYSICAL EXAMINATION:   Vitals:    08/13/18 1452   BP: 121/76   Pulse: 74       Physical Exam:  Vitals reviewed.    Constitutional: He appears well-developed and well-nourished.     Eyes: Pupils are equal, round, and reactive to light. Conjunctivae and EOM are normal.     Cardiovascular: Normal distal pulses and no edema.     Abdominal: Soft.     Skin: Skin displays no rash on trunk and no rash on extremities. Skin displays no lesions on trunk and no lesions on extremities.     Psych/Behavior: He is alert. He is oriented to person, place, and time. He has a normal mood and affect.      Musculoskeletal:        Neck: Range of motion is full.     Neurological:        DTRs: Tricep reflexes are 2+ on the right side and 2+ on the left side. Bicep reflexes are 2+ on the right side and 2+ on the left side. Brachioradialis reflexes are 2+ on the right side and 2+ on the left side. Patellar reflexes are 2+ on the right side and 2+ on the left side. Achilles reflexes are 2+ on the right side and 2+ on the left side.       Back Exam     Muscle Strength   Right Quadriceps:  5/5   Left Quadriceps:  5/5   Right Hamstrings:  5/5   Left Hamstrings:  5/5             SI joint:   Palpation at the right and left SI joints not painful  MOUSTAPHA test is negative bilaterally  Gaenslen test is negative bilaterally  Thigh thrust test is negative bilaterally    Neurologic Exam     Mental Status   Oriented to person, place, and time.   Speech: speech is normal   Level of consciousness: alert    Cranial Nerves   Cranial nerves II through XII intact.     CN III, IV, VI   Pupils are equal, round, and reactive to light.  Extraocular motions are normal.     Motor Exam   Muscle bulk: normal  Overall muscle tone: normal    Strength   Right deltoid: 5/5  Left deltoid: 5/5  Right biceps: 5/5  Left biceps: 5/5  Right triceps: 5/5  Left triceps: 5/5  Right wrist flexion: 5/5  Left wrist flexion: 5/5  Right wrist extension: 5/5  Left wrist extension: 5/5  Right interossei: 5/5  Left interossei: 5/5  Right iliopsoas: 5/5  Left iliopsoas: 5/5  Right quadriceps: 5/5  Left quadriceps: 5/5  Right hamstrin/5  Left hamstrin/5  Right anterior tibial: 5/5  Left anterior tibial: 5/5  Right posterior tibial: 5/5  Left posterior tibial: 5/5  Right peroneal: 5/5  Left peroneal: 5/5  Right gastroc: 5/5  Left gastroc: 5/5    Sensory Exam   Light touch normal.   Pinprick normal.     Gait, Coordination, and Reflexes     Gait  Gait: normal    Coordination   Finger to nose coordination: normal  Tandem walking coordination: normal    Reflexes    Right brachioradialis: 2+  Left brachioradialis: 2+  Right biceps: 2+  Left biceps: 2+  Right triceps: 2+  Left triceps: 2+  Right patellar: 2+  Left patellar: 2+  Right achilles: 2+  Left achilles: 2+  Right plantar: normal  Left plantar: normal  Right Zavala: absent  Left Zavala: absent  Right ankle clonus: absent  Left ankle clonus: absent        DIAGNOSTIC DATA:  I personally reviewed the following imaging:   Lumbar spine MRI 04/2018: L4-5 ddd    ASSESMENT:  This is a 33 y.o. male with     Problem List Items Addressed This Visit        Neuro    DDD (degenerative disc disease), lumbar - Primary            PLAN:  Continue conservative management  FU as needed.          Jagdish Luis MD  Pager: [phone]

## 2019-05-31 ENCOUNTER — OFFICE VISIT (OUTPATIENT)
Dept: CARDIOLOGY | Facility: CLINIC | Age: 34
End: 2019-05-31
Payer: COMMERCIAL

## 2019-05-31 VITALS
WEIGHT: 268.06 LBS | OXYGEN SATURATION: 97 % | SYSTOLIC BLOOD PRESSURE: 124 MMHG | DIASTOLIC BLOOD PRESSURE: 79 MMHG | HEIGHT: 75 IN | HEART RATE: 50 BPM | BODY MASS INDEX: 33.33 KG/M2

## 2019-05-31 DIAGNOSIS — G51.0 BELL'S PALSY: ICD-10-CM

## 2019-05-31 DIAGNOSIS — M51.36 DDD (DEGENERATIVE DISC DISEASE), LUMBAR: ICD-10-CM

## 2019-05-31 DIAGNOSIS — M54.59 MECHANICAL LOW BACK PAIN: ICD-10-CM

## 2019-05-31 DIAGNOSIS — R07.9 CHEST PAIN, UNSPECIFIED TYPE: Primary | ICD-10-CM

## 2019-05-31 PROCEDURE — 3008F PR BODY MASS INDEX (BMI) DOCUMENTED: ICD-10-PCS | Mod: CPTII,S$GLB,, | Performed by: INTERNAL MEDICINE

## 2019-05-31 PROCEDURE — 99205 PR OFFICE/OUTPT VISIT, NEW, LEVL V, 60-74 MIN: ICD-10-PCS | Mod: S$GLB,,, | Performed by: INTERNAL MEDICINE

## 2019-05-31 PROCEDURE — 99999 PR PBB SHADOW E&M-EST. PATIENT-LVL III: ICD-10-PCS | Mod: PBBFAC,,, | Performed by: INTERNAL MEDICINE

## 2019-05-31 PROCEDURE — 99999 PR PBB SHADOW E&M-EST. PATIENT-LVL III: CPT | Mod: PBBFAC,,, | Performed by: INTERNAL MEDICINE

## 2019-05-31 PROCEDURE — 3008F BODY MASS INDEX DOCD: CPT | Mod: CPTII,S$GLB,, | Performed by: INTERNAL MEDICINE

## 2019-05-31 PROCEDURE — 99205 OFFICE O/P NEW HI 60 MIN: CPT | Mod: S$GLB,,, | Performed by: INTERNAL MEDICINE

## 2019-05-31 NOTE — PROGRESS NOTES
Subjective:    Patient ID:  Fransico Masters is a 33 y.o. male who presents for evaluation of Chest Pain      HPI     32 y/o male seen in clinic for urgent visit for CP and abnormal ECG. He has a hx of DDD and recent Bell's Palsy. Bell's palsy occurred a few day ago. This AM woke up and had right thigh pain and substernal chest discomfort which was mild, lasted mins, non radiating, and resolved spontaneously. No recurrence. Denies SOB/CHANDLER, orthopnea, PND, syncope, palps, LE edema. Non smoker, does not exercise regularly, no fam hx of early CAD. ECG computer read states Acute MI with inferior injury pattern. Per my interpretation, SR with no acute MI, likely repolarization changes.     Review of Systems   Constitution: Negative for malaise/fatigue.   HENT: Negative for congestion.    Eyes: Negative for blurred vision.   Cardiovascular: Positive for chest pain. Negative for claudication, cyanosis, dyspnea on exertion, irregular heartbeat, leg swelling, near-syncope, orthopnea, palpitations, paroxysmal nocturnal dyspnea and syncope.   Respiratory: Negative for shortness of breath.    Endocrine: Negative for polyuria.   Hematologic/Lymphatic: Negative for bleeding problem.   Skin: Negative for itching and rash.   Musculoskeletal: Negative for joint swelling, muscle cramps and muscle weakness.   Gastrointestinal: Negative for abdominal pain, hematemesis, hematochezia, melena, nausea and vomiting.   Genitourinary: Negative for dysuria and hematuria.   Neurological: Negative for dizziness, focal weakness, headaches, light-headedness, loss of balance and weakness.   Psychiatric/Behavioral: Negative for depression. The patient is not nervous/anxious.         Objective:    Physical Exam   Constitutional: He is oriented to person, place, and time. He appears well-developed and well-nourished.   HENT:   Head: Normocephalic and atraumatic.   Neck: Neck supple. No JVD present.   Cardiovascular: Normal rate, regular rhythm and  normal heart sounds.   Pulses:       Carotid pulses are 2+ on the right side, and 2+ on the left side.       Radial pulses are 2+ on the right side, and 2+ on the left side.        Femoral pulses are 2+ on the right side, and 2+ on the left side.       Dorsalis pedis pulses are 2+ on the right side, and 2+ on the left side.        Posterior tibial pulses are 2+ on the right side, and 2+ on the left side.   Pulmonary/Chest: Effort normal and breath sounds normal.   Abdominal: Soft. Bowel sounds are normal.   Musculoskeletal: He exhibits no edema.   Neurological: He is alert and oriented to person, place, and time.   Skin: Skin is warm and dry.   Psychiatric: He has a normal mood and affect. His behavior is normal. Thought content normal.         Assessment:       1. Chest pain, unspecified type    2. DDD (degenerative disc disease), lumbar    3. Mechanical low back pain    4. Bell's palsy      34 y/o pt with hx and presentation as above. Doing well from a cardiac perspective and compensated from a HF perspective. ECG changes not acute MI. Does have lateral changes that appear chronic. Will obtain noninvasive cardiac stress imaging to complete workup. Discussed the etiology, evaluation, and management of chest pain. Discussed the importance of med compliance, heart healthy diet, and regular exercise.          Plan:       -Exercise stress echo  -f/u PRN

## 2019-06-03 ENCOUNTER — HOSPITAL ENCOUNTER (OUTPATIENT)
Dept: CARDIOLOGY | Facility: HOSPITAL | Age: 34
Discharge: HOME | End: 2019-06-03
Attending: INTERNAL MEDICINE
Payer: COMMERCIAL

## 2019-06-03 DIAGNOSIS — R07.9 CHEST PAIN, UNSPECIFIED TYPE: ICD-10-CM

## 2019-06-03 PROCEDURE — 93320 DOPPLER ECHO COMPLETE: CPT | Mod: 26,,, | Performed by: STUDENT IN AN ORGANIZED HEALTH CARE EDUCATION/TRAINING PROGRAM

## 2019-06-03 PROCEDURE — 93351 STRESS TTE COMPLETE: CPT | Mod: 26,,, | Performed by: STUDENT IN AN ORGANIZED HEALTH CARE EDUCATION/TRAINING PROGRAM

## 2019-06-03 PROCEDURE — 93351 ECHOCARDIOGRAM STRESS TEST WITH COLOR FLOW DOPPLER (CUPID ONLY): ICD-10-PCS | Mod: 26,,, | Performed by: STUDENT IN AN ORGANIZED HEALTH CARE EDUCATION/TRAINING PROGRAM

## 2019-06-03 PROCEDURE — 93325 DOPPLER ECHO COLOR FLOW MAPG: CPT

## 2019-06-03 PROCEDURE — 93325 DOPPLER ECHO COLOR FLOW MAPG: CPT | Mod: 26,,, | Performed by: STUDENT IN AN ORGANIZED HEALTH CARE EDUCATION/TRAINING PROGRAM

## 2019-06-03 PROCEDURE — 93320 ECHOCARDIOGRAM STRESS TEST WITH COLOR FLOW DOPPLER (CUPID ONLY): ICD-10-PCS | Mod: 26,,, | Performed by: STUDENT IN AN ORGANIZED HEALTH CARE EDUCATION/TRAINING PROGRAM

## 2019-06-03 PROCEDURE — 93325 ECHOCARDIOGRAM STRESS TEST WITH COLOR FLOW DOPPLER (CUPID ONLY): ICD-10-PCS | Mod: 26,,, | Performed by: STUDENT IN AN ORGANIZED HEALTH CARE EDUCATION/TRAINING PROGRAM

## 2019-06-04 LAB
AORTIC ROOT ANNULUS: 3.95 CM
AORTIC VALVE CUSP SEPERATION: 2.1 CM
AV INDEX (PROSTH): 1
AV MEAN GRADIENT: 4.56 MMHG
AV PEAK GRADIENT: 7.62 MMHG
AV VALVE AREA: 3.09 CM2
AV VELOCITY RATIO: 0.89
CV ECHO LV RWT: 0.36 CM
CV STRESS BASE HR: 57 BPM
DIASTOLIC BLOOD PRESSURE: 64 MMHG
DOP CALC AO PEAK VEL: 1.38 M/S
DOP CALC AO VTI: 23.11 CM
DOP CALC LVOT AREA: 3.08 CM2
DOP CALC LVOT DIAMETER: 1.98 CM
DOP CALC LVOT PEAK VEL: 1.23 M/S
DOP CALC LVOT STROKE VOLUME: 71.43 CM3
DOP CALCLVOT PEAK VEL VTI: 23.21 CM
E WAVE DECELERATION TIME: 101.94 MSEC
E/A RATIO: 1.15
ECHO LV POSTERIOR WALL: 0.9 CM (ref 0.6–1.1)
FRACTIONAL SHORTENING: 32 % (ref 28–44)
INTERVENTRICULAR SEPTUM: 0.77 CM (ref 0.6–1.1)
IVRT: 0.07 MSEC
LA MAJOR: 5.08 CM
LA MINOR: 4.57 CM
LA WIDTH: 3.32 CM
LEFT ATRIUM SIZE: 2.84 CM
LEFT ATRIUM VOLUME: 38.56 CM3
LEFT INTERNAL DIMENSION IN SYSTOLE: 3.38 CM (ref 2.1–4)
LEFT VENTRICLE DIASTOLIC VOLUME: 118.24 ML
LEFT VENTRICLE SYSTOLIC VOLUME: 46.8 ML
LEFT VENTRICULAR INTERNAL DIMENSION IN DIASTOLE: 5 CM (ref 3.5–6)
LEFT VENTRICULAR MASS: 143.49 G
MV PEAK A VEL: 0.66 M/S
MV PEAK E VEL: 0.76 M/S
OHS CV CPX 1 MINUTE RECOVERY HEART RATE: 131 BPM
OHS CV CPX 85 PERCENT MAX PREDICTED HEART RATE MALE: 161
OHS CV CPX ESTIMATED METS: 13
OHS CV CPX MAX PREDICTED HEART RATE: 189
OHS CV CPX PATIENT IS FEMALE: 0
OHS CV CPX PATIENT IS MALE: 1
OHS CV CPX PEAK DIASTOLIC BLOOD PRESSURE: 80 MMHG
OHS CV CPX PEAK HEAR RATE: 171 BPM
OHS CV CPX PEAK RATE PRESSURE PRODUCT: NORMAL
OHS CV CPX PEAK SYSTOLIC BLOOD PRESSURE: 219 MMHG
OHS CV CPX PERCENT MAX PREDICTED HEART RATE ACHIEVED: 90
OHS CV CPX RATE PRESSURE PRODUCT PRESENTING: 7467
PISA TR MAX VEL: 2 M/S
PULM VEIN S/D RATIO: 0.93
PV PEAK D VEL: 0.59 M/S
PV PEAK S VEL: 0.55 M/S
PV PEAK VELOCITY: 1.42 CM/S
RA MAJOR: 4.88 CM
RA PRESSURE: 3 MMHG
RIGHT VENTRICULAR END-DIASTOLIC DIMENSION: 2.86 CM
STRESS ANGINA INDEX: 0
STRESS ECHO POST EXERCISE DUR MIN: 12 MINUTES
STRESS ECHO POST EXERCISE DUR SEC: 0 SECONDS
SYSTOLIC BLOOD PRESSURE: 131 MMHG
TR MAX PG: 16 MMHG
TV REST PULMONARY ARTERY PRESSURE: 19 MMHG

## 2019-06-05 ENCOUNTER — TELEPHONE (OUTPATIENT)
Dept: CARDIOLOGY | Facility: CLINIC | Age: 34
End: 2019-06-05

## 2019-06-05 NOTE — TELEPHONE ENCOUNTER
----- Message from Brant Hopper MD sent at 6/5/2019  6:53 AM CDT -----  Your stress test was normal

## 2019-07-08 ENCOUNTER — TELEPHONE (OUTPATIENT)
Dept: NEUROSURGERY | Facility: CLINIC | Age: 34
End: 2019-07-08

## 2019-07-08 NOTE — TELEPHONE ENCOUNTER
----- Message from Jyoti Mercado sent at 7/8/2019 12:55 PM CDT -----  No. [phone]    Patient is having severe leg and back pain.   He would like an appointment soon.    Please call.

## 2019-07-09 ENCOUNTER — TELEPHONE (OUTPATIENT)
Dept: NEUROSURGERY | Facility: CLINIC | Age: 34
End: 2019-07-09

## 2019-07-09 DIAGNOSIS — M54.50 LOW BACK PAIN, NON-SPECIFIC: ICD-10-CM

## 2019-07-09 NOTE — TELEPHONE ENCOUNTER
----- Message from Jagdish Luis MD sent at 7/8/2019  6:06 PM CDT -----  Contact: Self [phone]      ----- Message -----  From: Shivani Chacon  Sent: 7/8/2019   3:49 PM  To: Toby Dubon Staff    Patient is returning your call.  Please advise.

## 2020-07-23 PROBLEM — R06.81 APNEA: Status: ACTIVE | Noted: 2018-04-17

## 2020-07-23 PROBLEM — J31.0 CHRONIC RHINITIS: Status: ACTIVE | Noted: 2020-07-23

## 2020-07-23 PROBLEM — Z91.038 ALLERGY TO INSECT STINGS: Status: ACTIVE | Noted: 2020-07-23

## 2020-07-23 PROBLEM — I86.1 SCROTAL VARICES: Status: ACTIVE | Noted: 2020-07-23

## 2021-12-16 DIAGNOSIS — U07.1 COVID-19: Primary | ICD-10-CM

## 2021-12-17 ENCOUNTER — INFUSION (OUTPATIENT)
Dept: INFECTIOUS DISEASES | Facility: HOSPITAL | Age: 36
End: 2021-12-17
Attending: EMERGENCY MEDICINE
Payer: OTHER GOVERNMENT

## 2021-12-17 VITALS
TEMPERATURE: 98 F | OXYGEN SATURATION: 99 % | RESPIRATION RATE: 18 BRPM | WEIGHT: 270 LBS | HEART RATE: 68 BPM | SYSTOLIC BLOOD PRESSURE: 117 MMHG | DIASTOLIC BLOOD PRESSURE: 70 MMHG | HEIGHT: 75 IN | BODY MASS INDEX: 33.57 KG/M2

## 2021-12-17 DIAGNOSIS — U07.1 COVID-19: Primary | ICD-10-CM

## 2021-12-17 PROCEDURE — M0243 CASIRIVI AND IMDEVI INFUSION: HCPCS | Performed by: INTERNAL MEDICINE

## 2021-12-17 PROCEDURE — 63600175 PHARM REV CODE 636 W HCPCS: Performed by: INTERNAL MEDICINE

## 2021-12-17 PROCEDURE — 25000003 PHARM REV CODE 250: Performed by: INTERNAL MEDICINE

## 2021-12-17 RX ORDER — SODIUM CHLORIDE 0.9 % (FLUSH) 0.9 %
10 SYRINGE (ML) INJECTION
Status: DISCONTINUED | OUTPATIENT
Start: 2021-12-17 | End: 2022-11-08

## 2021-12-17 RX ORDER — ONDANSETRON 4 MG/1
4 TABLET, ORALLY DISINTEGRATING ORAL ONCE AS NEEDED
Status: DISCONTINUED | OUTPATIENT
Start: 2021-12-17 | End: 2022-11-08

## 2021-12-17 RX ORDER — EPINEPHRINE 0.3 MG/.3ML
0.3 INJECTION SUBCUTANEOUS
Status: DISCONTINUED | OUTPATIENT
Start: 2021-12-17 | End: 2022-11-08

## 2021-12-17 RX ORDER — ACETAMINOPHEN 325 MG/1
650 TABLET ORAL ONCE AS NEEDED
Status: DISCONTINUED | OUTPATIENT
Start: 2021-12-17 | End: 2022-11-08

## 2021-12-17 RX ORDER — ALBUTEROL SULFATE 90 UG/1
2 AEROSOL, METERED RESPIRATORY (INHALATION)
Status: DISCONTINUED | OUTPATIENT
Start: 2021-12-17 | End: 2022-11-08

## 2021-12-17 RX ORDER — DIPHENHYDRAMINE HYDROCHLORIDE 50 MG/ML
25 INJECTION INTRAMUSCULAR; INTRAVENOUS ONCE AS NEEDED
Status: DISCONTINUED | OUTPATIENT
Start: 2021-12-17 | End: 2022-11-08

## 2021-12-17 RX ADMIN — CASIRIVIMAB AND IMDEVIMAB 600 MG: 600; 600 INJECTION, SOLUTION, CONCENTRATE INTRAVENOUS at 11:12

## 2022-11-14 ENCOUNTER — LAB VISIT (OUTPATIENT)
Dept: LAB | Facility: HOSPITAL | Age: 37
End: 2022-11-14
Attending: INTERNAL MEDICINE

## 2022-11-14 DIAGNOSIS — Z00.00 ROUTINE MEDICAL EXAM: ICD-10-CM

## 2022-11-14 LAB
ALBUMIN SERPL BCP-MCNC: 4.1 G/DL (ref 3.5–5.2)
ALP SERPL-CCNC: 67 U/L (ref 55–135)
ALT SERPL W/O P-5'-P-CCNC: 53 U/L (ref 10–44)
ANION GAP SERPL CALC-SCNC: 9 MMOL/L (ref 8–16)
AST SERPL-CCNC: 22 U/L (ref 10–40)
BASOPHILS # BLD AUTO: 0.04 K/UL (ref 0–0.2)
BASOPHILS NFR BLD: 0.5 % (ref 0–1.9)
BILIRUB SERPL-MCNC: 0.8 MG/DL (ref 0.1–1)
BUN SERPL-MCNC: 16 MG/DL (ref 6–20)
CALCIUM SERPL-MCNC: 9.5 MG/DL (ref 8.7–10.5)
CHLORIDE SERPL-SCNC: 105 MMOL/L (ref 95–110)
CHOLEST SERPL-MCNC: 222 MG/DL (ref 120–199)
CHOLEST/HDLC SERPL: 5.2 {RATIO} (ref 2–5)
CO2 SERPL-SCNC: 24 MMOL/L (ref 23–29)
CREAT SERPL-MCNC: 1.1 MG/DL (ref 0.5–1.4)
DIFFERENTIAL METHOD: ABNORMAL
EOSINOPHIL # BLD AUTO: 0.2 K/UL (ref 0–0.5)
EOSINOPHIL NFR BLD: 2.5 % (ref 0–8)
ERYTHROCYTE [DISTWIDTH] IN BLOOD BY AUTOMATED COUNT: 12.8 % (ref 11.5–14.5)
EST. GFR  (NO RACE VARIABLE): >60 ML/MIN/1.73 M^2
GLUCOSE SERPL-MCNC: 91 MG/DL (ref 70–110)
HCT VFR BLD AUTO: 48 % (ref 40–54)
HDLC SERPL-MCNC: 43 MG/DL (ref 40–75)
HDLC SERPL: 19.4 % (ref 20–50)
HGB BLD-MCNC: 16.7 G/DL (ref 14–18)
IMM GRANULOCYTES # BLD AUTO: 0.03 K/UL (ref 0–0.04)
IMM GRANULOCYTES NFR BLD AUTO: 0.4 % (ref 0–0.5)
LDLC SERPL CALC-MCNC: 151.6 MG/DL (ref 63–159)
LYMPHOCYTES # BLD AUTO: 3.1 K/UL (ref 1–4.8)
LYMPHOCYTES NFR BLD: 38.5 % (ref 18–48)
MCH RBC QN AUTO: 28.6 PG (ref 27–31)
MCHC RBC AUTO-ENTMCNC: 34.8 G/DL (ref 32–36)
MCV RBC AUTO: 82 FL (ref 82–98)
MONOCYTES # BLD AUTO: 0.6 K/UL (ref 0.3–1)
MONOCYTES NFR BLD: 8 % (ref 4–15)
NEUTROPHILS # BLD AUTO: 4 K/UL (ref 1.8–7.7)
NEUTROPHILS NFR BLD: 50.1 % (ref 38–73)
NONHDLC SERPL-MCNC: 179 MG/DL
NRBC BLD-RTO: 0 /100 WBC
PLATELET # BLD AUTO: 276 K/UL (ref 150–450)
PMV BLD AUTO: 9.1 FL (ref 9.2–12.9)
POTASSIUM SERPL-SCNC: 4.4 MMOL/L (ref 3.5–5.1)
PROT SERPL-MCNC: 7.1 G/DL (ref 6–8.4)
RBC # BLD AUTO: 5.83 M/UL (ref 4.6–6.2)
SODIUM SERPL-SCNC: 138 MMOL/L (ref 136–145)
TRIGL SERPL-MCNC: 137 MG/DL (ref 30–150)
WBC # BLD AUTO: 7.97 K/UL (ref 3.9–12.7)

## 2022-11-14 PROCEDURE — 80061 LIPID PANEL: CPT | Performed by: INTERNAL MEDICINE

## 2022-11-14 PROCEDURE — 85025 COMPLETE CBC W/AUTO DIFF WBC: CPT | Performed by: INTERNAL MEDICINE

## 2022-11-14 PROCEDURE — 80053 COMPREHEN METABOLIC PANEL: CPT | Performed by: INTERNAL MEDICINE

## 2022-11-14 PROCEDURE — 36415 COLL VENOUS BLD VENIPUNCTURE: CPT | Performed by: INTERNAL MEDICINE

## 2023-11-09 PROBLEM — G47.33 OBSTRUCTIVE SLEEP APNEA SYNDROME: Status: ACTIVE | Noted: 2018-04-17

## 2023-12-04 ENCOUNTER — HOSPITAL ENCOUNTER (OUTPATIENT)
Dept: RADIOLOGY | Facility: HOSPITAL | Age: 38
Discharge: HOME | End: 2023-12-04
Attending: INTERNAL MEDICINE
Payer: COMMERCIAL

## 2023-12-04 DIAGNOSIS — R29.810 FACIAL DROOP: ICD-10-CM

## 2023-12-04 DIAGNOSIS — G51.0 BELL'S PALSY: ICD-10-CM

## 2023-12-04 PROCEDURE — 70450 CT HEAD/BRAIN W/O DYE: CPT | Mod: TC

## 2023-12-04 PROCEDURE — 70450 CT HEAD WITHOUT CONTRAST: ICD-10-PCS | Mod: 26,,, | Performed by: RADIOLOGY

## 2023-12-04 PROCEDURE — 70450 CT HEAD/BRAIN W/O DYE: CPT | Mod: 26,,, | Performed by: RADIOLOGY

## 2024-07-16 ENCOUNTER — OFFICE VISIT (OUTPATIENT)
Dept: URGENT CARE | Facility: CLINIC | Age: 39
End: 2024-07-16
Payer: COMMERCIAL

## 2024-07-16 VITALS
WEIGHT: 283 LBS | OXYGEN SATURATION: 97 % | TEMPERATURE: 98 F | HEART RATE: 67 BPM | SYSTOLIC BLOOD PRESSURE: 109 MMHG | RESPIRATION RATE: 16 BRPM | BODY MASS INDEX: 35.19 KG/M2 | DIASTOLIC BLOOD PRESSURE: 76 MMHG | HEIGHT: 75 IN

## 2024-07-16 DIAGNOSIS — U07.1 COVID-19 VIRUS DETECTED: ICD-10-CM

## 2024-07-16 DIAGNOSIS — R05.9 COUGH, UNSPECIFIED TYPE: Primary | ICD-10-CM

## 2024-07-16 LAB
CTP QC/QA: YES
CTP QC/QA: YES
FLUAV AG NPH QL: NEGATIVE
FLUBV AG NPH QL: NEGATIVE
SARS-COV-2 AG RESP QL IA.RAPID: POSITIVE

## 2024-07-16 PROCEDURE — 87804 INFLUENZA ASSAY W/OPTIC: CPT | Mod: QW,,, | Performed by: NURSE PRACTITIONER

## 2024-07-16 PROCEDURE — 87811 SARS-COV-2 COVID19 W/OPTIC: CPT | Mod: QW,S$GLB,, | Performed by: NURSE PRACTITIONER

## 2024-07-16 PROCEDURE — 99204 OFFICE O/P NEW MOD 45 MIN: CPT | Mod: 25,S$GLB,, | Performed by: NURSE PRACTITIONER

## 2024-07-16 NOTE — PROGRESS NOTES
"Subjective:      Patient ID: Fransico Masters is a 38 y.o. male.    Vitals:  height is 6' 3" (1.905 m) and weight is 128.4 kg (283 lb). His oral temperature is 98 °F (36.7 °C). His blood pressure is 109/76 and his pulse is 67. His respiration is 16 and oxygen saturation is 97%.     Chief Complaint: Sinus Problem    Fransico Masters is a 38 year old male presenting to the clinic with c/o Sinus congestion, cough, runny nose X 3 days. He has had no fever, vomiting, diarrhea and is tolerating PO intake without difficulty. He has been taking OTC medications with relief.    Sinus Problem  This is a new problem. The current episode started in the past 7 days. Associated symptoms include congestion and coughing. (Body aches)       Constitution: Negative.   HENT:  Positive for congestion.    Neck: neck negative.   Cardiovascular: Negative.    Eyes: Negative.    Respiratory:  Positive for cough.    Gastrointestinal: Negative.    Genitourinary: Negative.    Musculoskeletal: Negative.    Skin: Negative.    Neurological: Negative.       Objective:     Physical Exam   Constitutional: He is oriented to person, place, and time. He appears well-developed. He is cooperative.  Non-toxic appearance. He does not appear ill. No distress.   HENT:   Head: Normocephalic and atraumatic.   Ears:   Right Ear: Hearing and external ear normal.   Left Ear: Hearing and external ear normal.   Nose: Nose normal. No mucosal edema, rhinorrhea or nasal deformity. No epistaxis. Right sinus exhibits no maxillary sinus tenderness and no frontal sinus tenderness. Left sinus exhibits no maxillary sinus tenderness and no frontal sinus tenderness.   Mouth/Throat: Uvula is midline, oropharynx is clear and moist and mucous membranes are normal. No trismus in the jaw. Normal dentition. No uvula swelling. No oropharyngeal exudate, posterior oropharyngeal edema or posterior oropharyngeal erythema.   Eyes: Conjunctivae and lids are normal. No scleral icterus. "   Neck: Trachea normal and phonation normal. Neck supple. No edema present. No erythema present. No neck rigidity present.   Cardiovascular: Normal rate, regular rhythm, normal heart sounds and normal pulses.   Pulmonary/Chest: Effort normal and breath sounds normal. No respiratory distress. He has no decreased breath sounds. He has no rhonchi.   Abdominal: Normal appearance.   Musculoskeletal: Normal range of motion.         General: No deformity. Normal range of motion.   Neurological: He is alert and oriented to person, place, and time. He exhibits normal muscle tone. Coordination normal.   Skin: Skin is warm, dry, intact, not diaphoretic and not pale.   Psychiatric: His speech is normal and behavior is normal. Judgment and thought content normal.   Nursing note and vitals reviewed.      Assessment:     1. Cough, unspecified type        Plan:     The patient is positive for COVID. Instructed on symptomatic treatment and CDC recommendations. He appears well hydrated and nontoxic. In no distress in clinic. ED precautions given for any worsening symptoms.     Cough, unspecified type  -     SARS Coronavirus 2 Antigen, POCT Manual Read  -     POCT Influenza A/B Rapid Antigen